# Patient Record
Sex: MALE | Race: WHITE | Employment: PART TIME | URBAN - METROPOLITAN AREA
[De-identification: names, ages, dates, MRNs, and addresses within clinical notes are randomized per-mention and may not be internally consistent; named-entity substitution may affect disease eponyms.]

---

## 2019-03-22 ENCOUNTER — OFFICE VISIT (OUTPATIENT)
Dept: FAMILY MEDICINE | Facility: CLINIC | Age: 39
End: 2019-03-22
Payer: COMMERCIAL

## 2019-03-22 ENCOUNTER — ANCILLARY PROCEDURE (OUTPATIENT)
Dept: GENERAL RADIOLOGY | Facility: CLINIC | Age: 39
End: 2019-03-22
Attending: INTERNAL MEDICINE
Payer: COMMERCIAL

## 2019-03-22 VITALS
TEMPERATURE: 97.6 F | HEART RATE: 69 BPM | WEIGHT: 212 LBS | SYSTOLIC BLOOD PRESSURE: 118 MMHG | BODY MASS INDEX: 27.21 KG/M2 | OXYGEN SATURATION: 97 % | HEIGHT: 74 IN | DIASTOLIC BLOOD PRESSURE: 72 MMHG

## 2019-03-22 DIAGNOSIS — J98.09: ICD-10-CM

## 2019-03-22 DIAGNOSIS — J10.1 INFLUENZA A: Primary | ICD-10-CM

## 2019-03-22 PROCEDURE — 99213 OFFICE O/P EST LOW 20 MIN: CPT | Performed by: INTERNAL MEDICINE

## 2019-03-22 PROCEDURE — 71046 X-RAY EXAM CHEST 2 VIEWS: CPT | Mod: FY

## 2019-03-22 RX ORDER — GABAPENTIN 400 MG/1
CAPSULE ORAL
COMMUNITY
Start: 2018-12-29

## 2019-03-22 RX ORDER — ALBUTEROL SULFATE 90 UG/1
2 AEROSOL, METERED RESPIRATORY (INHALATION) EVERY 6 HOURS
Qty: 8.5 G | Refills: 0 | Status: SHIPPED | OUTPATIENT
Start: 2019-03-22

## 2019-03-22 RX ORDER — PREDNISONE 20 MG/1
40 TABLET ORAL DAILY
Qty: 8 TABLET | Refills: 0 | Status: SHIPPED | OUTPATIENT
Start: 2019-03-22 | End: 2019-03-26

## 2019-03-22 RX ORDER — OSELTAMIVIR PHOSPHATE 75 MG/1
75 CAPSULE ORAL 2 TIMES DAILY
Qty: 10 CAPSULE | Refills: 0 | Status: SHIPPED | OUTPATIENT
Start: 2019-03-22 | End: 2019-03-27

## 2019-03-22 ASSESSMENT — MIFFLIN-ST. JEOR: SCORE: 1956.38

## 2019-03-26 ENCOUNTER — OFFICE VISIT (OUTPATIENT)
Dept: FAMILY MEDICINE | Facility: CLINIC | Age: 39
End: 2019-03-26
Payer: COMMERCIAL

## 2019-03-26 VITALS
HEIGHT: 74 IN | OXYGEN SATURATION: 99 % | HEART RATE: 82 BPM | BODY MASS INDEX: 26.82 KG/M2 | DIASTOLIC BLOOD PRESSURE: 72 MMHG | TEMPERATURE: 97 F | WEIGHT: 209 LBS | SYSTOLIC BLOOD PRESSURE: 118 MMHG

## 2019-03-26 DIAGNOSIS — J10.1 INFLUENZA A: Primary | ICD-10-CM

## 2019-03-26 DIAGNOSIS — R05.9 COUGH: ICD-10-CM

## 2019-03-26 PROCEDURE — 99213 OFFICE O/P EST LOW 20 MIN: CPT | Performed by: FAMILY MEDICINE

## 2019-03-26 RX ORDER — PREDNISONE 20 MG/1
TABLET ORAL
Qty: 20 TABLET | Refills: 0 | Status: SHIPPED | OUTPATIENT
Start: 2019-03-26 | End: 2019-04-08

## 2019-03-26 RX ORDER — CODEINE PHOSPHATE AND GUAIFENESIN 10; 100 MG/5ML; MG/5ML
2 SOLUTION ORAL EVERY 4 HOURS PRN
Qty: 120 ML | Refills: 0 | Status: SHIPPED | OUTPATIENT
Start: 2019-03-26 | End: 2020-01-06

## 2019-03-26 SDOH — HEALTH STABILITY: MENTAL HEALTH: HOW OFTEN DO YOU HAVE A DRINK CONTAINING ALCOHOL?: NEVER

## 2019-03-26 ASSESSMENT — MIFFLIN-ST. JEOR: SCORE: 1942.77

## 2019-03-26 NOTE — PROGRESS NOTES
"  SUBJECTIVE:                                                      Corey Wilcox is a 39 year old male who presents to clinic today for the following health issues:    Acute Illness   Acute illness concerns: Influenza and bronchitis-   Last Office Visit:  03/22/2019 4 days ago- dx with influenza 6 days ago  Onset: x 2 weeks since sx started    Fever: no    Chills/Sweats: no    Headache (location?): from coughing    Sinus Pressure:no    Conjunctivitis:  no    Ear Pain: no    Rhinorrhea: no    Congestion: YES    Sore Throat: no     Cough: YES-non-productive-    Wheeze: no     Decreased Appetite: little    Nausea: no    Vomiting: no    Diarrhea:  no    Dysuria/Freq.: no    Fatigue/Achiness: YES- coughing all night    Sick/Strep Exposure: YES     Therapies Tried and outcome: pt was on prednisone, robitussin; cough syrup helped for sleep at night.     Problem list and histories reviewed & adjusted, as indicated.  Additional history: as documented    ROS:  Constitutional, HEENT, cardiovascular, pulmonary, GI, , musculoskeletal, neuro, skin, endocrine and psych systems are negative, except as otherwise noted.  This document serves as a record of the services and decisions personally performed and made by Juwan Quintana MD. It was created on his behalf by Berlin Bello, a trained medical scribe. The creation of this document is based the provider's statements to the medical scribe.  Scribe Berlin Bello 11:08 AM, March 26, 2019    OBJECTIVE:                                                    /72   Pulse 82   Temp 97  F (36.1  C) (Tympanic)   Ht 1.88 m (6' 2\")   Wt 94.8 kg (209 lb)   SpO2 99%   BMI 26.83 kg/m   Body mass index is 26.83 kg/m .   GENERAL: no apparent distress  EYES: Conjunctiva are not injected, no discharge.  EARS: Left TM -no erythema, no effusion,  not bulged.               Right TM -no erythema, no effusion,  not bulged.  NOSE: no discharge, no sinus tenderness  THROAT: no erythema, no exudate, no " "lesions  NECK: supple, no adenopathy.  CARDIAC: regular rate and rhythm, no murmur  RESP: clear, mild-moderate wheezing, no rales, no rhonchi  ABD: soft, no distension, no tenderness  SKIN: No rashes    ASSESSMENT/PLAN:                                                    Corey was seen today for uri.    Diagnoses and all orders for this visit:    Influenza A - Persistent symptoms, increasing dosage, begin:    -     predniSONE (DELTASONE) 20 MG tablet; Take 60 mg by mouth daily for 3 days, THEN 40 mg daily for 3 days, THEN 20 mg daily for 3 days, THEN 10 mg daily for 4 days.    Cough - Persistent symptoms, begin:   -     guaiFENesin-codeine (ROBITUSSIN AC) 100-10 MG/5ML solution; Take 10 mLs by mouth every 4 hours as needed for cough    Risks, benefits and alternatives of treatments discussed. Plan agreed on.      Followup: Return in about 2 weeks (around 4/9/2019), or if symptoms worsen or fail to improve, for recheck.    See patient instructions.       BMI:   Estimated body mass index is 26.83 kg/m  as calculated from the following:    Height as of this encounter: 1.88 m (6' 2\").    Weight as of this encounter: 94.8 kg (209 lb).   Weight management plan: Discussed healthy diet and exercise guidelines     The information in this document, created by the medical scribe for me, accurately reflects the services I personally performed and the decisions made by me. I have reviewed and approved this document for accuracy prior to leaving the patient care area.  11:09 AM, 03/26/19        Gigi Quintana MD   Pager: [phone]    "

## 2020-01-06 ENCOUNTER — NURSE TRIAGE (OUTPATIENT)
Dept: FAMILY MEDICINE | Facility: CLINIC | Age: 40
End: 2020-01-06

## 2020-01-06 ENCOUNTER — OFFICE VISIT (OUTPATIENT)
Dept: FAMILY MEDICINE | Facility: CLINIC | Age: 40
End: 2020-01-06
Payer: COMMERCIAL

## 2020-01-06 VITALS
HEART RATE: 79 BPM | OXYGEN SATURATION: 99 % | DIASTOLIC BLOOD PRESSURE: 70 MMHG | HEIGHT: 74 IN | SYSTOLIC BLOOD PRESSURE: 100 MMHG | TEMPERATURE: 98.9 F | BODY MASS INDEX: 26.44 KG/M2 | WEIGHT: 206 LBS

## 2020-01-06 DIAGNOSIS — R10.12 ABDOMINAL PAIN, LEFT UPPER QUADRANT: Primary | ICD-10-CM

## 2020-01-06 DIAGNOSIS — R31.9 HEMATURIA: ICD-10-CM

## 2020-01-06 LAB
ALBUMIN UR-MCNC: NEGATIVE MG/DL
APPEARANCE UR: CLEAR
BASOPHILS # BLD AUTO: 0.1 10E9/L (ref 0–0.2)
BASOPHILS NFR BLD AUTO: 1 %
BILIRUB UR QL STRIP: NEGATIVE
COLOR UR AUTO: YELLOW
DIFFERENTIAL METHOD BLD: NORMAL
EOSINOPHIL # BLD AUTO: 0.1 10E9/L (ref 0–0.7)
EOSINOPHIL NFR BLD AUTO: 2.3 %
ERYTHROCYTE [DISTWIDTH] IN BLOOD BY AUTOMATED COUNT: 12.3 % (ref 10–15)
GLUCOSE UR STRIP-MCNC: NEGATIVE MG/DL
HCT VFR BLD AUTO: 43.7 % (ref 40–53)
HGB BLD-MCNC: 15.2 G/DL (ref 13.3–17.7)
HGB UR QL STRIP: ABNORMAL
KETONES UR STRIP-MCNC: NEGATIVE MG/DL
LEUKOCYTE ESTERASE UR QL STRIP: NEGATIVE
LYMPHOCYTES # BLD AUTO: 2.2 10E9/L (ref 0.8–5.3)
LYMPHOCYTES NFR BLD AUTO: 42.9 %
MCH RBC QN AUTO: 29.6 PG (ref 26.5–33)
MCHC RBC AUTO-ENTMCNC: 34.8 G/DL (ref 31.5–36.5)
MCV RBC AUTO: 85 FL (ref 78–100)
MONOCYTES # BLD AUTO: 0.4 10E9/L (ref 0–1.3)
MONOCYTES NFR BLD AUTO: 7.9 %
NEUTROPHILS # BLD AUTO: 2.4 10E9/L (ref 1.6–8.3)
NEUTROPHILS NFR BLD AUTO: 45.9 %
NITRATE UR QL: NEGATIVE
PH UR STRIP: 7 PH (ref 5–7)
PLATELET # BLD AUTO: 255 10E9/L (ref 150–450)
RBC # BLD AUTO: 5.13 10E12/L (ref 4.4–5.9)
RBC #/AREA URNS AUTO: ABNORMAL /HPF
SOURCE: ABNORMAL
SP GR UR STRIP: 1.02 (ref 1–1.03)
UROBILINOGEN UR STRIP-ACNC: 0.2 EU/DL (ref 0.2–1)
WBC # BLD AUTO: 5.2 10E9/L (ref 4–11)
WBC #/AREA URNS AUTO: ABNORMAL /HPF

## 2020-01-06 PROCEDURE — 99214 OFFICE O/P EST MOD 30 MIN: CPT | Performed by: FAMILY MEDICINE

## 2020-01-06 PROCEDURE — 81001 URINALYSIS AUTO W/SCOPE: CPT | Performed by: FAMILY MEDICINE

## 2020-01-06 PROCEDURE — 36415 COLL VENOUS BLD VENIPUNCTURE: CPT | Performed by: FAMILY MEDICINE

## 2020-01-06 PROCEDURE — 80053 COMPREHEN METABOLIC PANEL: CPT | Performed by: FAMILY MEDICINE

## 2020-01-06 PROCEDURE — 83690 ASSAY OF LIPASE: CPT | Performed by: FAMILY MEDICINE

## 2020-01-06 PROCEDURE — 85025 COMPLETE CBC W/AUTO DIFF WBC: CPT | Performed by: FAMILY MEDICINE

## 2020-01-06 ASSESSMENT — MIFFLIN-ST. JEOR: SCORE: 1929.16

## 2020-01-06 NOTE — TELEPHONE ENCOUNTER
"SEE TODAY IN OFFICE:   * You need to be examined today. Let me give you an appointment.    CALL BACK IF OR PROCEED TO ER:  * Abdominal pain is constant and present for more than 2 hours  * You become worse      Additional Information    Negative: Passed out (i.e., fainted, collapsed and was not responding)    Negative: Shock suspected (e.g., cold/pale/clammy skin, too weak to stand, low BP, rapid pulse)    Negative: Sounds like a life-threatening emergency to the triager    Negative: Chest pain    Negative: Pain is mainly in upper abdomen (if needed ask: 'is it mainly above the belly button?')    Negative: SEVERE abdominal pain (e.g., excruciating)    Negative: Vomiting red blood or black (coffee ground) material    Negative: Bloody, black, or tarry bowel movements    Negative: Unable to urinate (or only a few drops) and bladder feels very full    Negative: Pain in scrotum persists > 1 hour    Negative: Constant abdominal pain lasting > 2 hours    Negative: Vomiting bile (green color)    Negative: Patient sounds very sick or weak to the triager    Negative: Vomiting and abdomen looks much more swollen than usual    Negative: White of the eyes have turned yellow (i.e., jaundice)    Negative: Blood in urine (red, pink, or tea-colored)    Negative: Fever > 103 F (39.4 C)    Negative: Fever > 101 F (38.3 C) and over 60 years of age    Negative: Fever > 100.0 F (37.8 C) and has diabetes mellitus or a weak immune system (e.g., HIV positive, cancer chemotherapy, organ transplant, splenectomy, chronic steroids)    Negative: Fever > 100.0 F (37.8 C) and bedridden (e.g., nursing home patient, stroke, chronic illness, recovering from surgery)    Patient wants to be seen    Negative: MILD pain that comes and goes (cramps) lasts > 24 hours    Negative: Age > 60 years    Answer Assessment - Initial Assessment Questions  1. LOCATION: \"Where does it hurt?\"       Upper, right under rib cage on left side  2. RADIATION: \"Does the pain " "shoot anywhere else?\" (e.g., chest, back)      no  3. ONSET: \"When did the pain begin?\" (Minutes, hours or days ago)       Couple of months  4. SUDDEN: \"Gradual or sudden onset?\"      gradual  5. PATTERN \"Does the pain come and go, or is it constant?\"     - If constant: \"Is it getting better, staying the same, or worsening?\"       (Note: Constant means the pain never goes away completely; most serious pain is constant and it progresses)      - If intermittent: \"How long does it last?\" \"Do you have pain now?\"      (Note: Intermittent means the pain goes away completely between bouts)      Intermittent, lasts for hours, then goes away  6. SEVERITY: \"How bad is the pain?\"  (e.g., Scale 1-10; mild, moderate, or severe)     - MILD (1-3): doesn't interfere with normal activities, abdomen soft and not tender to touch      - MODERATE (4-7): interferes with normal activities or awakens from sleep, tender to touch      - SEVERE (8-10): excruciating pain, doubled over, unable to do any normal activities        Moderate, sometimes worse than others  7. RECURRENT SYMPTOM: \"Have you ever had this type of abdominal pain before?\" If so, ask: \"When was the last time?\" and \"What happened that time?\"       No  8. CAUSE: \"What do you think is causing the abdominal pain?\"      unknown  9. RELIEVING/AGGRAVATING FACTORS: \"What makes it better or worse?\" (e.g., movement, antacids, bowel movement)      none  10. OTHER SYMPTOMS: \"Has there been any vomiting, diarrhea, constipation, or urine problems?\"        Some decrease in appetite    Protocols used: ABDOMINAL PAIN - MALE-A-OH    ELLI CareyN, RN  [facility]    "

## 2020-01-06 NOTE — PROGRESS NOTES
"Reginald Wilcox is a 39 year old male who presents to clinic today for the following health issues:    HPI     1. LOCATION: Upper, right under rib cage on left side - LUQ  2. RADIATION: no  3. ONSET: Couple of months - he had an episode about 7 months ago  4. SUDDEN: gradual  5. PATTERN: Intermittent, lasts for hours, then goes away  6. SEVERITY: Moderate, sometimes worse than others  7. RECURRENT SYMPTOM: No  8. CAUSE: unknown  9. RELIEVING/AGGRAVATING FACTORS: none  10. OTHER SYMPTOMS: Some decrease in appetite    Hard time sleeping. BM does not improve pain. His stomach has been gurgling louder with digestion. No diarrhea, no GERD. He does not have SOB. His lower back on the left has been painful. He drinks coffee every day. He sleeps on a hot pack due to back pain. It only acts up when he is sitting. It does not feel hard when it is happening.     Reviewed and updated as needed this visit by provider:  Tobacco  Allergies  Meds  Problems  Med Hx  Surg Hx  Fam Hx         Review of Systems   Constitutional, HEENT, cardiovascular, pulmonary, GI, , musculoskeletal, neuro, skin, endocrine and psych systems are negative, except as otherwise noted.    This document serves as a record of the services and decisions personally performed and made by Juwan Quintana MD. It was created on his behalf by Santiago Dial, a trained medical scribe. The creation of this document is based the provider's statements to the medical scribe.  Scribquinn Dial 3:50 PM, January 6, 2020    Objective   /70   Pulse 79   Temp 98.9  F (37.2  C) (Oral)   Ht 1.88 m (6' 2\")   Wt 93.4 kg (206 lb)   SpO2 99%   BMI 26.45 kg/m   Body mass index is 26.45 kg/m .  Physical Exam   GENERAL: healthy, alert, well nourished, well hydrated, no distress  EYES: Eyes grossly normal to inspection, extraocular movements - intact, and PERRL  HENT: ear canals- normal; TMs- normal; Nose- normal; Mouth- no ulcers, no lesions  NECK: no " "tenderness, no adenopathy, no asymmetry, no masses, no stiffness; thyroid- normal to palpation  RESP: lungs clear to auscultation - no rales, no rhonchi, no wheezes  CV: regular rates and rhythm, normal S1 S2, no S3 or S4 and no murmur, no click or rub -  ABDOMEN: soft, no tenderness, no  hepatosplenomegaly, no masses, normal bowel sounds  MS: extremities- no gross deformities noted, no edema  BACK: no CVA tenderness, no paralumbar tenderness  NEURO: strength and tone- normal, sensory exam- grossly normal, mentation- intact, speech- normal, reflexes- symmetric  PSYCH: Alert and oriented times 3; speech- coherent , normal rate and volume; able to articulate logical thoughts, able to abstract reason, no tangential thoughts, no hallucinations or delusions, affect- normal  LYMPHATICS: ant. cervical- normal, post. cervical- normal, axillary- normal, supraclavicular- normal, inguinal- normal        Assessment & Plan   Corey was seen today for abdominal pain.    Diagnoses and all orders for this visit:    Abdominal pain, left upper quadrant - worse for a couple months. Maalox/lidocaine cocktail did not improve symptoms. Likely chest/abdominal wall pain as he can make it worse withcrucnhing. Recommend Ibuprofen for 3-5 days and stretching the area, Aleve can be used as well. Reassurrance given today and future plans discussed if symptoms do not improve.   -     Lipase  -     CBC with platelets and differential  -     Comprehensive metabolic panel (BMP + Alb, Alk Phos, ALT, AST, Total. Bili, TP)  -     UA reflex to Microscopic  -     Urine Microscopic    BMI:   Estimated body mass index is 26.83 kg/m  as calculated from the following:    Height as of 3/26/19: 1.88 m (6' 2\").    Weight as of 3/26/19: 94.8 kg (209 lb).   Weight management plan: Discussed healthy diet and exercise guidelines    See Patient Instructions    Return in about 1 week (around 1/13/2020), or if symptoms worsen or fail to improve, for Recheck.    The " information in this document, created by the medical scribe for me, accurately reflects the services I personally performed and the decisions made by me. I have reviewed and approved this document for accuracy prior to leaving the patient care area.  4:40 PM, 01/06/20        Gigi Quintana MD      [address] [address]  [email or web address]   Office: [phone]  Pager: [phone]

## 2020-01-07 LAB
ALBUMIN SERPL-MCNC: 4.6 G/DL (ref 3.4–5)
ALP SERPL-CCNC: 65 U/L (ref 40–150)
ALT SERPL W P-5'-P-CCNC: 20 U/L (ref 0–70)
ANION GAP SERPL CALCULATED.3IONS-SCNC: 8 MMOL/L (ref 3–14)
AST SERPL W P-5'-P-CCNC: 13 U/L (ref 0–45)
BILIRUB SERPL-MCNC: 0.5 MG/DL (ref 0.2–1.3)
BUN SERPL-MCNC: 14 MG/DL (ref 7–30)
CALCIUM SERPL-MCNC: 9.4 MG/DL (ref 8.5–10.1)
CHLORIDE SERPL-SCNC: 104 MMOL/L (ref 94–109)
CO2 SERPL-SCNC: 27 MMOL/L (ref 20–32)
CREAT SERPL-MCNC: 1.05 MG/DL (ref 0.66–1.25)
GFR SERPL CREATININE-BSD FRML MDRD: 90 ML/MIN/{1.73_M2}
GLUCOSE SERPL-MCNC: 95 MG/DL (ref 70–99)
LIPASE SERPL-CCNC: 119 U/L (ref 73–393)
POTASSIUM SERPL-SCNC: 3.9 MMOL/L (ref 3.4–5.3)
PROT SERPL-MCNC: 7.8 G/DL (ref 6.8–8.8)
SODIUM SERPL-SCNC: 139 MMOL/L (ref 133–144)

## 2020-01-07 NOTE — RESULT ENCOUNTER NOTE
Dear Corey,    Here is a summary of your recent test results:  -Normal red blood cell (hgb) levels, normal white blood cell count and normal platelet levels.  -Liver and gallbladder tests are normal (ALT,AST, Alk phos, bilirubin), kidney function is normal (Cr, GFR), sodium is normal, potassium is normal, calcium is normal, glucose is normal.  -Lipase (pancreas test) is normal.  -Urine has red blood cells noted.  ADVISE: rechecking a urinalysis and urine culture in one month - please make a lab appointment.    For additional lab test information, [email or web address] is an excellent reference.           Thank you very much for trusting me and [facility].     Healthy regards,  Gigi Quintana MD

## 2020-02-11 ENCOUNTER — TELEPHONE (OUTPATIENT)
Dept: FAMILY MEDICINE | Facility: CLINIC | Age: 40
End: 2020-02-11

## 2020-02-11 NOTE — TELEPHONE ENCOUNTER
Patient calling stating his abdominal pain is getting worse.  Patient states he will go a day without having a BM.  Pain is in the left side below rib cage and pain has been radiating to the right side.  Pain is random and is not necessarily related to food.  Patient has been having constant abdominal pain for the past 2-3 days and last night was the worse pain and patient almost went to the ED but did not.  Patient ate two small chicken tenders last night at dinner, omlette for lunch (ham, cheese, onion, salsa).  Patient's last BM was Sunday.    Patient has used any OTC medication for pain relief.  Tried a heating pad without any relief.    Advised patient to go ED for evaluation due to constant abdominal pain for the past 2-3 days.  Patient stated understanding and was agreeable with plan.    BROOKE Canales, RN  [facility]

## 2020-02-11 NOTE — TELEPHONE ENCOUNTER
Patient calling regarding lab results.  Advised  to schedule appointment and labs would be placed based on providers lab result notes from 2/6/2020.    ELLI CanalesN, RN  [facility]

## 2020-03-02 ENCOUNTER — HEALTH MAINTENANCE LETTER (OUTPATIENT)
Age: 40
End: 2020-03-02

## 2020-12-14 ENCOUNTER — HEALTH MAINTENANCE LETTER (OUTPATIENT)
Age: 40
End: 2020-12-14

## 2021-04-18 ENCOUNTER — HEALTH MAINTENANCE LETTER (OUTPATIENT)
Age: 41
End: 2021-04-18

## 2021-10-02 ENCOUNTER — HEALTH MAINTENANCE LETTER (OUTPATIENT)
Age: 41
End: 2021-10-02

## 2021-10-23 NOTE — PROGRESS NOTES
"  SUBJECTIVE:   Corey Wilcox is a 39 year old male who presents to clinic today for the following health issues:      Acute Illness   Acute illness concerns: cough - was diagnosed with influenza x 2 days ago - went to urgent care - was given Robitussin AC - cough syrup  Onset: x 6 days    Fever: no     Chills/Sweats: no     Headache (location?): YES    Sinus Pressure:YES    Conjunctivitis:  no    Ear Pain: uncomfortable pressure in both ears    Rhinorrhea: no     Congestion: no     Sore Throat: no      Cough: YES- coughing so hard it causes Headaches    Wheeze: YES    Decreased Appetite: no     Nausea: no     Vomiting: no     Diarrhea:  no     Dysuria/Freq.: no     Fatigue/Achiness: no     Sick/Strep Exposure: YES- kids have influenza     Therapies Tried and outcome: Robitussin AC, ibuprofen     Patient's children were treated with Tamiflu for influenza a.  He saw a PA but but was only given  cough medicine.  Symptoms have abruptly changed and now has coughing with wheezing no history of asthma. .          There is no problem list on file for this patient.    No past surgical history on file.    Social History     Tobacco Use     Smoking status: Former Smoker     Smokeless tobacco: Former User   Substance Use Topics     Alcohol use: Yes     No family history on file.      Current Outpatient Medications   Medication Sig Dispense Refill     gabapentin (NEURONTIN) 400 MG capsule        No Known Allergies    Reviewed and updated as needed this visit by clinical staff       Reviewed and updated as needed this visit by Provider         ROS:  CONSTITUTIONAL:chills and fatigue  RESP:NEGATIVE for significant cough or SOB and wheezing  CV: NEGATIVE for chest pain, palpitations or peripheral edema    OBJECTIVE:     /72 (BP Location: Right arm, Patient Position: Sitting, Cuff Size: Adult Regular)   Pulse 69   Temp 97.6  F (36.4  C) (Oral)   Ht 1.88 m (6' 2\")   Wt 96.2 kg (212 lb)   SpO2 97%   BMI 27.22 kg/m    Body " mass index is 27.22 kg/m .     GENERAL: healthy, alert and no distress  HENT: ear canals and TM's normal, nose and mouth without ulcers or lesions  NECK: no adenopathy, no asymmetry, masses, or scars and thyroid normal to palpation  RESP: expiratory wheezes throughout  CV: regular rates and rhythm, normal S1 S2, no S3 or S4 and no murmur, click or rub    Diagnostic Test Results:  Results for orders placed or performed in visit on 03/22/19 (from the past 24 hour(s))   XR Chest 2 Views    Narrative    XR CHEST 2 VW 3/22/2019 3:15 PM    HISTORY: Influenza A      Impression    IMPRESSION: Negative.    ABIGAIL BALDERAS MD       ASSESSMENT/PLAN:   1. Influenza A    - XR Chest 2 Views  - oseltamivir (TAMIFLU) 75 MG capsule; Take 1 capsule (75 mg) by mouth 2 times daily for 5 days  Dispense: 10 capsule; Refill: 0    2.  Viral induced wheezing.    - predniSONE (DELTASONE) 20 MG tablet; Take 40 mg by mouth daily.  Dispense: 8 tablet; Refill: 0  - albuterol (PROAIR HFA/PROVENTIL HFA/VENTOLIN HFA) 108 (90 Base) MCG/ACT inhaler; Inhale 2 puffs into the lungs every 6 hours  Dispense: 8.5 g; Refill: 0    Side effects of the prednisone and the Tamiflu were discussed. He understands I expect him to be significantly improved over the weekend if not he is to let us know.    Michael Siegel MD  [facility]   yes

## 2022-05-14 ENCOUNTER — HEALTH MAINTENANCE LETTER (OUTPATIENT)
Age: 42
End: 2022-05-14

## 2022-09-03 ENCOUNTER — HEALTH MAINTENANCE LETTER (OUTPATIENT)
Age: 42
End: 2022-09-03

## 2023-06-03 ENCOUNTER — HEALTH MAINTENANCE LETTER (OUTPATIENT)
Age: 43
End: 2023-06-03